# Patient Record
Sex: FEMALE | Race: WHITE | Employment: FULL TIME | URBAN - METROPOLITAN AREA
[De-identification: names, ages, dates, MRNs, and addresses within clinical notes are randomized per-mention and may not be internally consistent; named-entity substitution may affect disease eponyms.]

---

## 2020-12-09 ENCOUNTER — INITIAL PRENATAL (OUTPATIENT)
Dept: OBGYN CLINIC | Age: 25
End: 2020-12-09
Payer: COMMERCIAL

## 2020-12-09 VITALS
WEIGHT: 127 LBS | TEMPERATURE: 98.1 F | SYSTOLIC BLOOD PRESSURE: 108 MMHG | HEART RATE: 100 BPM | DIASTOLIC BLOOD PRESSURE: 66 MMHG

## 2020-12-09 PROBLEM — O35.BXX0 FETAL CARDIAC ECHOGENIC FOCUS, ANTEPARTUM: Status: ACTIVE | Noted: 2020-12-09

## 2020-12-09 PROBLEM — Z3A.23 23 WEEKS GESTATION OF PREGNANCY: Status: ACTIVE | Noted: 2020-12-09

## 2020-12-09 LAB
GLUCOSE URINE, POC: NORMAL
PROTEIN UA: NEGATIVE

## 2020-12-09 PROCEDURE — 81002 URINALYSIS NONAUTO W/O SCOPE: CPT | Performed by: OBSTETRICS & GYNECOLOGY

## 2020-12-09 PROCEDURE — 76817 TRANSVAGINAL US OBSTETRIC: CPT | Performed by: OBSTETRICS & GYNECOLOGY

## 2020-12-09 PROCEDURE — G8484 FLU IMMUNIZE NO ADMIN: HCPCS | Performed by: OBSTETRICS & GYNECOLOGY

## 2020-12-09 PROCEDURE — 1036F TOBACCO NON-USER: CPT | Performed by: OBSTETRICS & GYNECOLOGY

## 2020-12-09 PROCEDURE — G8427 DOCREV CUR MEDS BY ELIG CLIN: HCPCS | Performed by: OBSTETRICS & GYNECOLOGY

## 2020-12-09 PROCEDURE — 99202 OFFICE O/P NEW SF 15 MIN: CPT | Performed by: OBSTETRICS & GYNECOLOGY

## 2020-12-09 PROCEDURE — G8421 BMI NOT CALCULATED: HCPCS | Performed by: OBSTETRICS & GYNECOLOGY

## 2020-12-09 PROCEDURE — 76811 OB US DETAILED SNGL FETUS: CPT | Performed by: OBSTETRICS & GYNECOLOGY

## 2020-12-09 PROCEDURE — 99203 OFFICE O/P NEW LOW 30 MIN: CPT | Performed by: OBSTETRICS & GYNECOLOGY

## 2020-12-09 SDOH — HEALTH STABILITY: MENTAL HEALTH: HOW OFTEN DO YOU HAVE A DRINK CONTAINING ALCOHOL?: NEVER

## 2020-12-09 NOTE — LETTER
[facility] MATERNAL FETAL MEDICINE  [address] [facility] [address]   Dept: [phone]  Loc: [phone]  Phong Beckwith MD                                           [facility] Consultation Letter     20     Ashlyn Mtz,*     Re: Patient: Micheal Mena  YOB: 1995    MR Number: [id number] Date of Visit: 2020     Dear Dr. Chucky Echavarria had the pleasure of seeing your patient, Micheal Mena, in consultation in the [facility] Fetal Medicine Department of [facility]). DOS: 20     [facility] MATERNAL FETAL MEDICINE      MATERNAL-FETAL MEDICINE CONSULT    Referring/Requesting Provider: Cesar Mathis MD  [address] [facility], [address] [place] [address]       CHIEF COMPLAINT:     HISTORY OF PRESENT ILLNESS:   Yesika Joyce is a 30 y.o. female  at 23w0d who presents for ultrasound and consultation regarding fetal intracardiac echogenic focus (ICEF). She has no complaints. OB HISTORY:  OB History    Para Term  AB Living   1 0 0 0 0 0   SAB TAB Ectopic Molar Multiple Live Births                    # Outcome Date GA Lbr Terry/2nd Weight Sex Delivery Anes PTL Lv   1 Current                PAST MEDICAL HISTORY:  Past Medical History:   Diagnosis Date    Herpes simplex virus (HSV) infection     Rh negative (A-) with negative antibody screening.        PAST SURGICAL HISTORY:  Past Surgical History:   Procedure Laterality Date    ANTERIOR CRUCIATE LIGAMENT REPAIR Left 2010    KNEE SURGERY Left     WISDOM TOOTH EXTRACTION Bilateral        PERTINENT FAMILY HISTORY:  History reviewed. No pertinent family history.     MEDS:   Current Outpatient Rx   Medication Sig Dispense Refill    Prenatal Vit-Fe Fumarate-FA (PRENATAL VITAMIN PO) Take 1 tablet by mouth daily         ALLERGY:   No Known Allergies     Review of the systems:  Yesika Joyce denies fever, chills, headaches, visual changes, stuffy/running nose, sore throat, chest pain, heart palpitations, edema, pain with breathing, shortness of breath, nausea or vomiting, difficult or painful urination, joint or muscle pain, numbness, tingling, tremors, and/or seizures. Physical Exam  Vitals signs and nursing note reviewed. /66   Pulse 100   Temp 98.1 °F (36.7 °C) (Temporal)   Wt 127 lb (57.6 kg)   LMP 2020   Constitutional: Appearance: She is comfortable  Heart rate is regular  Neck is supple with normal range of motion  No respiratory distress  Abdomen is soft  Neurological:      General: No focal deficit present. Mental Status: She is alert and oriented to person, place, and time. No calf tenderness  Psychiatric:         Mood and Affect: Mood normal.         Behavior: Behavior normal.    Fetal heart tones: Positive    IMAGING:  Please see ultrasound report for full details. LABS:    Initial Prenatal on 2020   Component Date Value Ref Range Status    Protein, UA 2020 Negative  Negative Final    Glucose, UA POC 2020 neg   Final         IMPRESSION:   Alec Durbin is a 30 y.o. female  with EICF. RECOMMENDATIONS:  Patient Active Problem List    Diagnosis Date Noted    23 weeks gestation of pregnancy 2020    Fetal cardiac echogenic focus, antepartum 2020     Overview Note:     - An intracardiac echogenic cardiac focus (EICF) is noted in the left ventricle. - The cardiac anatomy has been evaluated. The 4 chamber view, the 3 vessel view, the LVOT, the IVC and SVC, and the aortic arch appear normal. We did not visualize the RVOT,  ductal arch, and the pulmonary veins are not visualized. - She will be back after 2 weeks to complete the fetal anatomical study. - I informed her that EICF are noted approximately in 6% of anatomically normal fetuses and in 18% of fetuses with Downs syndrome.    - She is adopted but the FOB has no family history of aneuploidy.  - There are no other soft markers of aneuploidy. No major anomalies are noted. - Her adjusted age related  risk for Downs syndrome is very low (1 in 400). I offered her genetic testing (Cell free Fetal DNA). She declined genetic testing and further genetic counseling. Follow up: US after 2 weeks to complete the fetal anatomical study. The total patient time of the visit was 20 minutes, of which was greater than 50% of the time was spent counseling and coordinating care. Kayode Garduno MD           Thank you for allowing us to participate in the care of your patient. Should you or the family have any questions or concerns, please do not hesitate to contact us.     Sincerely,    Kayode Garduno MD

## 2020-12-09 NOTE — PATIENT INSTRUCTIONS
more sessions each day. Ease or reduce swelling in your feet, ankles, hands, and fingers  · If your fingers are puffy, take off your rings. · Do not eat high-salt foods, such as potato chips. · Prop up your feet on a stool or couch as much as possible. Sleep with pillows under your feet. · Do not stand for long periods of time or wear tight shoes. · Wear support stockings. Where can you learn more? Go to https://eDabba.Locappy. org and sign in to your [employer] account. Enter G264 in the Lithe[employer] box to learn more about \"Weeks 22 to 26 of Your Pregnancy: Care Instructions. \"     If you do not have an account, please click on the \"Sign Up Now\" link. Current as of: February 11, 2020               Content Version: 12.6  © 2278-2168 [employer]. Care instructions adapted under license by [facility] ([facility]). If you have questions about a medical condition or this instruction, always ask your healthcare professional. [place] [address] any warranty or liability for your use of this information. Patient Education        Learning About When to Call Your Doctor During Pregnancy (After 20 Weeks)  Your Care Instructions  It's common to have concerns about what might be a problem during pregnancy. Although most pregnant women don't have any serious problems, it's important to know when to call your doctor if you have certain symptoms or signs of labor. These are general suggestions. Your doctor may give you some more information about when to call. When to call your doctor (after 20 weeks)  Call 911 anytime you think you may need emergency care. For example, call if:  · You have severe vaginal bleeding. · You have sudden, severe pain in your belly. · You passed out (lost consciousness). · You have a seizure. · You see or feel the umbilical cord.   · You think you are about to deliver your baby and can't make it safely to the hospital.  Call your doctor now or seek immediate medical care if:  · You have vaginal bleeding. · You have belly pain. · You have a fever. · You have symptoms of preeclampsia, such as:  ? Sudden swelling of your face, hands, or feet. ? New vision problems (such as dimness, blurring, or seeing spots). ? A severe headache. · You have a sudden release of fluid from your vagina. (You think your water broke.)  · You think that you may be in labor. This means that you've had at least 6 contractions in an hour. · You notice that your baby has stopped moving or is moving much less than normal.  · You have symptoms of a urinary tract infection. These may include:  ? Pain or burning when you urinate. ? A frequent need to urinate without being able to pass much urine. ? Pain in the flank, which is just below the rib cage and above the waist on either side of the back. ? Blood in your urine. Watch closely for changes in your health, and be sure to contact your doctor if:  · You have vaginal discharge that smells bad. · You have skin changes, such as:  ? A rash. ? Itching. ? Yellow color to your skin. · You have other concerns about your pregnancy. If you have labor signs at 37 weeks or more  If you have signs of labor at 37 weeks or more, your doctor may tell you to call when your labor becomes more active. Symptoms of active labor include:  · Contractions that are regular. · Contractions that are less than 5 minutes apart. · Contractions that are hard to talk through. Follow-up care is a key part of your treatment and safety. Be sure to make and go to all appointments, and call your doctor if you are having problems. It's also a good idea to know your test results and keep a list of the medicines you take. Where can you learn more? Go to https://chpematteo.healthWeotta. org and sign in to your [employer] account.  Enter  in the NewGoTos box to learn more about \"Learning About When to Call Your Doctor During Pregnancy (After 20 Weeks). \"     If you do not have an account, please click on the \"Sign Up Now\" link. Current as of: February 11, 2020               Content Version: 12.6  © 5361-0884 [employer]. Care instructions adapted under license by [facility] ([facility]). If you have questions about a medical condition or this instruction, always ask your healthcare professional. [address] any warranty or liability for your use of this information.

## 2020-12-09 NOTE — PROGRESS NOTES
States baby is active Denies bleeding leakage of fluid or contractions. Call your obstetrician for:    Bleeding, leakage of fluid, abdominal tenderness, headaches, burred vision or  epigastric pain or decreased fetal movement or increased in urinary frequency.    Call if any questions or concerns

## 2020-12-09 NOTE — PROGRESS NOTES
DOS: 20     [facility] MATERNAL FETAL MEDICINE      MATERNAL-FETAL MEDICINE CONSULT    Referring/Requesting Provider: Nino Fields MD  [id number] [address], [address]       CHIEF COMPLAINT:     HISTORY OF PRESENT ILLNESS:   [facility] is a 30 y.o. female  at 23w0d who presents for ultrasound and consultation regarding fetal intracardiac echogenic focus (ICEF). She has no complaints. OB HISTORY:  OB History    Para Term  AB Living   1 0 0 0 0 0   SAB TAB Ectopic Molar Multiple Live Births                    # Outcome Date GA Lbr Terry/2nd Weight Sex Delivery Anes PTL Lv   1 Current                PAST MEDICAL HISTORY:  Past Medical History:   Diagnosis Date    Herpes simplex virus (HSV) infection     Rh negative (A-) with negative antibody screening.        PAST SURGICAL HISTORY:  Past Surgical History:   Procedure Laterality Date    ANTERIOR CRUCIATE LIGAMENT REPAIR Left 2010    KNEE SURGERY Left     WISDOM TOOTH EXTRACTION Bilateral        PERTINENT FAMILY HISTORY:  History reviewed. No pertinent family history. MEDS:   Current Outpatient Rx   Medication Sig Dispense Refill    Prenatal Vit-Fe Fumarate-FA (PRENATAL VITAMIN PO) Take 1 tablet by mouth daily         ALLERGY:   No Known Allergies     Review of the systems:  [facility] denies fever, chills, headaches, visual changes, stuffy/running nose, sore throat, chest pain, heart palpitations, edema, pain with breathing, shortness of breath, nausea or vomiting, difficult or painful urination, joint or muscle pain, numbness, tingling, tremors, and/or seizures. Physical Exam  Vitals signs and nursing note reviewed.  /66   Pulse 100   Temp 98.1 °F (36.7 °C) (Temporal)   Wt 127 lb (57.6 kg)   LMP 2020   Constitutional: Appearance: She is comfortable  Heart rate is regular  Neck is supple with normal range of motion  No respiratory distress  Abdomen is soft  Neurological:      General: No focal deficit present. Mental Status: She is alert and oriented to person, place, and time. No calf tenderness  Psychiatric:         Mood and Affect: Mood normal.         Behavior: Behavior normal.    Fetal heart tones: Positive    IMAGING:  Please see ultrasound report for full details. LABS:    Initial Prenatal on 2020   Component Date Value Ref Range Status    Protein, UA 2020 Negative  Negative Final    Glucose, UA POC 2020 neg   Final         IMPRESSION:   Robert Solo is a 30 y.o. female  with EICF. RECOMMENDATIONS:  Patient Active Problem List    Diagnosis Date Noted    23 weeks gestation of pregnancy 2020    Fetal cardiac echogenic focus, antepartum 2020     Overview Note:     - An intracardiac echogenic cardiac focus (EICF) is noted in the left ventricle. - The cardiac anatomy has been evaluated. The 4 chamber view, the 3 vessel view, the LVOT, the IVC and SVC, and the aortic arch appear normal. We did not visualize the RVOT,  ductal arch, and the pulmonary veins are not visualized. - She will be back after 2 weeks to complete the fetal anatomical study. - I informed her that EICF are noted approximately in 6% of anatomically normal fetuses and in 18% of fetuses with Downs syndrome.    - She is adopted but the FOB has no family history of aneuploidy.  - There are no other soft markers of aneuploidy. No major anomalies are noted. - Her adjusted age related  risk for Downs syndrome is very low (1 in 400). I offered her genetic testing (Cell free Fetal DNA). She declined genetic testing and further genetic counseling. Follow up: US after 2 weeks to complete the fetal anatomical study. The total patient time of the visit was 20 minutes, of which was greater than 50% of the time was spent counseling and coordinating care.     Fito Loco MD

## 2020-12-28 ENCOUNTER — ROUTINE PRENATAL (OUTPATIENT)
Dept: OBGYN CLINIC | Age: 25
End: 2020-12-28
Payer: COMMERCIAL

## 2020-12-28 VITALS — HEART RATE: 77 BPM | DIASTOLIC BLOOD PRESSURE: 61 MMHG | WEIGHT: 132 LBS | SYSTOLIC BLOOD PRESSURE: 128 MMHG

## 2020-12-28 LAB
GLUCOSE URINE, POC: NEGATIVE
PROTEIN UA: NEGATIVE

## 2020-12-28 PROCEDURE — 81002 URINALYSIS NONAUTO W/O SCOPE: CPT | Performed by: OBSTETRICS & GYNECOLOGY

## 2020-12-28 PROCEDURE — 99213 OFFICE O/P EST LOW 20 MIN: CPT

## 2020-12-28 PROCEDURE — 76805 OB US >/= 14 WKS SNGL FETUS: CPT | Performed by: OBSTETRICS & GYNECOLOGY
